# Patient Record
Sex: MALE | Race: WHITE
[De-identification: names, ages, dates, MRNs, and addresses within clinical notes are randomized per-mention and may not be internally consistent; named-entity substitution may affect disease eponyms.]

---

## 2021-03-14 ENCOUNTER — HOSPITAL ENCOUNTER (EMERGENCY)
Dept: HOSPITAL 50 - VM.ED | Age: 81
Discharge: HOME | End: 2021-03-14
Payer: MEDICARE
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
## 2021-03-14 DIAGNOSIS — Z79.899: ICD-10-CM

## 2021-03-14 DIAGNOSIS — F10.220: Primary | ICD-10-CM

## 2021-03-14 LAB
ANION GAP SERPL CALC-SCNC: 14 MMOL/L (ref 5–15)
BARBITURATES UR QL SCN: NEGATIVE
BENZODIAZ UR QL SCN: NEGATIVE
CHLORIDE SERPL-SCNC: 98 MMOL/L (ref 98–107)
EDDP,URINE SCREEN: NEGATIVE
METHADONE UR QL SCN: NEGATIVE
SODIUM SERPL-SCNC: 134 MMOL/L (ref 136–145)
TCA SCREEN,URINE: NEGATIVE
THC UR QL SCN>50 NG/ML: NEGATIVE

## 2021-03-14 NOTE — EDM.PDOC
ED HPI GENERAL MEDICAL PROBLEM





- General


Stated Complaint: WEAKNESS


Time Seen by Provider: 03/14/21 19:05


Source of Information: Reports: Patient, EMS


History Limitations: Reports: Intoxication





- History of Present Illness


INITIAL COMMENTS - FREE TEXT/NARRATIVE: 





Patient comes emergency department today by ambulance from the local [employer] bar 

with complaints of weakness.  According to EMS the patient has been sitting at 

the bar drinking most of the afternoon.  He went to leave and he was complaining

of weakness and had to be held up by 3 people.  He did not fall.  He did not 

strike his head.  He had no complaints.  They were concerned about his weakness 

as this is not typical for him as he is a regular at the bar there.  Upon 

arrival the patient does not want to be here.  He denies drinking any alcohol 

today.  He denies any complaints.  He denies any headache neck or back pain.  No

recent falls trauma or injury.  No visual acuity changes.  No chest pain no 

shortness of breath or difficulty breathing.  No cough or congestion.  He denies

any weakness dizziness lightheadedness.  He relates that he just needed a little

help getting out to his car so that he could drive himself home.  He denies any 

abdominal pain nausea or vomiting.  No paresthesias of his upper or lower 

extremities.  No change in the functionality of his upper or lower extremities. 

NO COVID symptoms no COVID exposure. 





- Related Data


                                    Allergies











Allergy/AdvReac Type Severity Reaction Status Date / Time


 


No Known Allergies Allergy   Verified 03/14/21 20:14











Home Meds: 


                                    Home Meds





Enalapril Maleate 20 mg PO DAILY 03/14/21 [History]


Metoprolol Succinate [Toprol Xl] 100 mg PO DAILY 03/14/21 [History]


amLODIPine [Norvasc] 5 mg PO DAILY 03/14/21 [History]











ED ROS GENERAL





- Review of Systems


Review Of Systems: Comprehensive ROS is negative, except as noted in HPI.





ED EXAM, NEURO





- Physical Exam


Exam: See Below


Text/Narrative:: 





Smells highly of alcoholic beverages and slurring his words. Sclera is mildly 

injected without exudate. 


Exam Limited By: Intoxication


General Appearance: Alert, WD/WN, Obese


Eye Exam: Bilateral Eye: EOMI, Proptosis


Ears: Normal External Exam, Hearing Grossly Normal, Normal TMs.  No: Normal 

Canal (Left canal unremarkable. RIght impacted with large amount of wax that was

removed with a curette a large amount of thick hard wax. )


Nose: Normal Inspection, Normal Mucosa, No Blood


Throat/Mouth: Normal Inspection, Normal Lips, Normal Teeth, Normal Gums, Normal 

Oropharynx, Normal Voice, No Airway Compromise


Head Exam: Atraumatic, Normocephalic


Neck: Normal Inspection, Supple, Non-Tender, Full Range of Motion


Respiratory/Chest: No Respiratory Distress, Lungs Clear, Normal Breath Sounds, 

No Accessory Muscle Use, Chest Non-Tender


Cardiovascular: Normal Peripheral Pulses, Regular Rate, Rhythm


GI/Abdominal: Normal Bowel Sounds, Soft, Non-Tender


 (Male) Exam: Deferred


Rectal (Males) Exam: Deferred


Neurological: Alert, Normal Mood/Affect, CN II-XII Intact, Normal Reflexes, No 

Motor/Sensory Deficits (other than obvious intoxication. )


Back Exam: Normal Inspection, Full Range of Motion


Extremities: Normal Inspection, Normal Range of Motion, Normal Capillary Refill


Psychiatric: Anxious


Skin Exam: Warm, Dry, Intact, Normal Color, No Rash


  ** #1 Interpretation


EKG Date: 03/14/21


Time: 19:34


Rate (Beats/Min): 76


Axis: Normal


P-Wave: Present


QRS: LBBB


ST-T: Normal


QT: Normal


Comparison: NA - No Prior EKG





Course





- Vital Signs


Last Recorded V/S: 


                                Last Vital Signs











Temp  97.8 F   03/14/21 18:55


 


Pulse  74   03/14/21 20:31


 


Resp  16   03/14/21 20:31


 


BP  178/93 H  03/14/21 20:31


 


Pulse Ox  91 L  03/14/21 20:31














- Orders/Labs/Meds


Orders: 


                               Active Orders 24 hr











 Category Date Time Status


 


 EKG Documentation Completion [RC] STAT Care  03/14/21 19:04 Active


 


 CULTURE URINE [RM] Stat Lab  03/14/21 20:00 Received











Labs: 


                                Laboratory Tests











  03/14/21 03/14/21 03/14/21 Range/Units





  19:22 19:22 19:22 


 


WBC  9.5    (4.0-10.0)  x10^3/uL


 


RBC  4.74    (4.5-6.0)  x10^6/uL


 


Hgb  15.2    (14.0-18.0)  g/dL


 


Hct  45.3    (40.0-52.0)  %


 


MCV  95.6 H    (78.0-93.0)  fL


 


MCH  32.1 H    (26.0-32.0)  pg


 


MCHC  33.6    (32.0-36.0)  g/dL


 


RDW Coeff of Anoop  12.8    (10.0-15.0)  %


 


Plt Count  231    (130-400)  x10^3/uL


 


Neut % (Auto)  56.4    (50.0-80.0)  %


 


Lymph % (Auto)  25.4    (25.0-50.0)  %


 


Mono % (Auto)  12.4 H    (2.0-11.0)  %


 


Eos % (Auto)  5.3 H    (0.0-4.0)  %


 


Baso % (Auto)  0.5    (0.2-1.2)  %


 


Sodium   134 L   (136-145)  mmol/L


 


Potassium   4.0   (3.5-5.1)  mmol/L


 


Chloride   98   ()  mmol/L


 


Carbon Dioxide   26   (21-32)  mmol/L


 


Anion Gap   14.0   (5-15)  mmol/L


 


BUN   12   (7-18)  mg/dL


 


Creatinine   1.3   (0.70-1.30)  mg/dL


 


Est Cr Clr Drug Dosing   45.32   mL/min


 


Estimated GFR (MDRD)   53   


 


Glucose   127 H   ()  mg/dL


 


Lactic Acid    3.7 H*  (0.4-2.0)  mmol/L


 


Calcium   8.7   (8.5-10.1)  mg/dL


 


Corrected Calcium   9.18   (8.5-10.1)  mg/dL


 


Magnesium     (1.8-2.4)  mg/dL


 


Total Bilirubin   0.3   (0.2-1.0)  mg/dL


 


AST   38 H   (15-37)  U/L


 


ALT   44   (16-63)  U/L


 


Alkaline Phosphatase   66   ()  U/L


 


Troponin I High Sens   8   (<=76)  ng/L


 


Total Protein   7.3   (6.4-8.2)  g/dL


 


Albumin   3.4   (3.4-5.0)  g/dL


 


Globulin   3.9   


 


Albumin/Globulin Ratio   0.87   


 


Lipase   263   ()  U/L


 


Urine Color     (YELLOW)  


 


Urine Appearance     (CLEAR)  


 


Urine pH     (5.0-8.0)  


 


Ur Specific Gravity     


 


Urine Protein     (NEGATIVE)  mg/dL


 


Urine Glucose (UA)     (NEGATIVE)  mg/dL


 


Urine Ketones     (NEGATIVE)  mg/dL


 


Urine Occult Blood     (NEGATIVE)  


 


Urine Nitrite     (NEGATIVE)  


 


Urine Bilirubin     (NEGATIVE)  


 


Urine Urobilinogen     (0.2)  EU/dL


 


Ur Leukocyte Esterase     (NEGATIVE)  


 


U Hyaline Cast (Auto)     


 


Urine RBC     (NOT SEEN)  /HPF


 


Urine WBC     (NOT SEEN)  /HPF


 


Ur Squamous Epith Cells     (NOT SEEN)  /HPF


 


Amorphous Sediment     


 


Urine Bacteria     (NOT SEEN)  /HPF


 


Urine Mucus     (NOT SEEN)  /LPF


 


Urine Opiates Screen     (NEAGTIVE)  


 


Ur Buprenorphine Scrn     (NEGATIVE)  


 


Ur Oxycodone Screen     (NEGATIVE)  


 


Ur EDDP (Meth Metab)     (NEGATIVE)  


 


Urine Methadone Screen     (NEGATIVE)  


 


Ur Barbiturates Screen     (NEGATIVE)  


 


Ur Tricyclics Screen     (NEGATIVE)  


 


Ur Phencyclidine Scrn     (NEGATIVE)  


 


Ur Amphetamine Screen     (NEGATIVE)  


 


U Methamphetamines Scrn     (NEGATIVE)  


 


Urine MDMA Screen     (NEGATIVE)  


 


U Benzodiazepines Scrn     (NEGATIVE)  


 


U Cocaine Metab Screen     (NEGATIVE)  


 


U Marijuana (THC) Screen     (NEGATIVE)  


 


Ethyl Alcohol   252 H   (0-3)  mg/dL














  03/14/21 03/14/21 03/14/21 Range/Units





  19:22 19:55 20:00 


 


WBC     (4.0-10.0)  x10^3/uL


 


RBC     (4.5-6.0)  x10^6/uL


 


Hgb     (14.0-18.0)  g/dL


 


Hct     (40.0-52.0)  %


 


MCV     (78.0-93.0)  fL


 


MCH     (26.0-32.0)  pg


 


MCHC     (32.0-36.0)  g/dL


 


RDW Coeff of Anoop     (10.0-15.0)  %


 


Plt Count     (130-400)  x10^3/uL


 


Neut % (Auto)     (50.0-80.0)  %


 


Lymph % (Auto)     (25.0-50.0)  %


 


Mono % (Auto)     (2.0-11.0)  %


 


Eos % (Auto)     (0.0-4.0)  %


 


Baso % (Auto)     (0.2-1.2)  %


 


Sodium     (136-145)  mmol/L


 


Potassium     (3.5-5.1)  mmol/L


 


Chloride     ()  mmol/L


 


Carbon Dioxide     (21-32)  mmol/L


 


Anion Gap     (5-15)  mmol/L


 


BUN     (7-18)  mg/dL


 


Creatinine     (0.70-1.30)  mg/dL


 


Est Cr Clr Drug Dosing     mL/min


 


Estimated GFR (MDRD)     


 


Glucose     ()  mg/dL


 


Lactic Acid     (0.4-2.0)  mmol/L


 


Calcium     (8.5-10.1)  mg/dL


 


Corrected Calcium     (8.5-10.1)  mg/dL


 


Magnesium  2.0    (1.8-2.4)  mg/dL


 


Total Bilirubin     (0.2-1.0)  mg/dL


 


AST     (15-37)  U/L


 


ALT     (16-63)  U/L


 


Alkaline Phosphatase     ()  U/L


 


Troponin I High Sens     (<=76)  ng/L


 


Total Protein     (6.4-8.2)  g/dL


 


Albumin     (3.4-5.0)  g/dL


 


Globulin     


 


Albumin/Globulin Ratio     


 


Lipase     ()  U/L


 


Urine Color    Yellow  (YELLOW)  


 


Urine Appearance    Slightly cloudy H  (CLEAR)  


 


Urine pH    5.5  (5.0-8.0)  


 


Ur Specific Gravity    1.015  


 


Urine Protein    30 H  (NEGATIVE)  mg/dL


 


Urine Glucose (UA)    Negative  (NEGATIVE)  mg/dL


 


Urine Ketones    Negative  (NEGATIVE)  mg/dL


 


Urine Occult Blood    Trace-intact H  (NEGATIVE)  


 


Urine Nitrite    Negative  (NEGATIVE)  


 


Urine Bilirubin    Negative  (NEGATIVE)  


 


Urine Urobilinogen    0.2  (0.2)  EU/dL


 


Ur Leukocyte Esterase    Trace H  (NEGATIVE)  


 


U Hyaline Cast (Auto)    Few  


 


Urine RBC    0-5  (NOT SEEN)  /HPF


 


Urine WBC    5-10 H  (NOT SEEN)  /HPF


 


Ur Squamous Epith Cells    Occasional H  (NOT SEEN)  /HPF


 


Amorphous Sediment    Few  


 


Urine Bacteria    Few H  (NOT SEEN)  /HPF


 


Urine Mucus    Few H  (NOT SEEN)  /LPF


 


Urine Opiates Screen   Negative   (NEAGTIVE)  


 


Ur Buprenorphine Scrn   Negative   (NEGATIVE)  


 


Ur Oxycodone Screen   Negative   (NEGATIVE)  


 


Ur EDDP (Meth Metab)   Negative   (NEGATIVE)  


 


Urine Methadone Screen   Negative   (NEGATIVE)  


 


Ur Barbiturates Screen   Negative   (NEGATIVE)  


 


Ur Tricyclics Screen   Negative   (NEGATIVE)  


 


Ur Phencyclidine Scrn   Negative   (NEGATIVE)  


 


Ur Amphetamine Screen   Negative   (NEGATIVE)  


 


U Methamphetamines Scrn   Negative   (NEGATIVE)  


 


Urine MDMA Screen   Negative   (NEGATIVE)  


 


U Benzodiazepines Scrn   Negative   (NEGATIVE)  


 


U Cocaine Metab Screen   Negative   (NEGATIVE)  


 


U Marijuana (THC) Screen   Negative   (NEGATIVE)  


 


Ethyl Alcohol     (0-3)  mg/dL














  03/14/21 03/14/21 Range/Units





  21:46 21:46 


 


WBC    (4.0-10.0)  x10^3/uL


 


RBC    (4.5-6.0)  x10^6/uL


 


Hgb    (14.0-18.0)  g/dL


 


Hct    (40.0-52.0)  %


 


MCV    (78.0-93.0)  fL


 


MCH    (26.0-32.0)  pg


 


MCHC    (32.0-36.0)  g/dL


 


RDW Coeff of Anoop    (10.0-15.0)  %


 


Plt Count    (130-400)  x10^3/uL


 


Neut % (Auto)    (50.0-80.0)  %


 


Lymph % (Auto)    (25.0-50.0)  %


 


Mono % (Auto)    (2.0-11.0)  %


 


Eos % (Auto)    (0.0-4.0)  %


 


Baso % (Auto)    (0.2-1.2)  %


 


Sodium    (136-145)  mmol/L


 


Potassium    (3.5-5.1)  mmol/L


 


Chloride    ()  mmol/L


 


Carbon Dioxide    (21-32)  mmol/L


 


Anion Gap    (5-15)  mmol/L


 


BUN    (7-18)  mg/dL


 


Creatinine    (0.70-1.30)  mg/dL


 


Est Cr Clr Drug Dosing    mL/min


 


Estimated GFR (MDRD)    


 


Glucose    ()  mg/dL


 


Lactic Acid  2.7 H*   (0.4-2.0)  mmol/L


 


Calcium    (8.5-10.1)  mg/dL


 


Corrected Calcium    (8.5-10.1)  mg/dL


 


Magnesium    (1.8-2.4)  mg/dL


 


Total Bilirubin    (0.2-1.0)  mg/dL


 


AST    (15-37)  U/L


 


ALT    (16-63)  U/L


 


Alkaline Phosphatase    ()  U/L


 


Troponin I High Sens    (<=76)  ng/L


 


Total Protein    (6.4-8.2)  g/dL


 


Albumin    (3.4-5.0)  g/dL


 


Globulin    


 


Albumin/Globulin Ratio    


 


Lipase    ()  U/L


 


Urine Color    (YELLOW)  


 


Urine Appearance    (CLEAR)  


 


Urine pH    (5.0-8.0)  


 


Ur Specific Gravity    


 


Urine Protein    (NEGATIVE)  mg/dL


 


Urine Glucose (UA)    (NEGATIVE)  mg/dL


 


Urine Ketones    (NEGATIVE)  mg/dL


 


Urine Occult Blood    (NEGATIVE)  


 


Urine Nitrite    (NEGATIVE)  


 


Urine Bilirubin    (NEGATIVE)  


 


Urine Urobilinogen    (0.2)  EU/dL


 


Ur Leukocyte Esterase    (NEGATIVE)  


 


U Hyaline Cast (Auto)    


 


Urine RBC    (NOT SEEN)  /HPF


 


Urine WBC    (NOT SEEN)  /HPF


 


Ur Squamous Epith Cells    (NOT SEEN)  /HPF


 


Amorphous Sediment    


 


Urine Bacteria    (NOT SEEN)  /HPF


 


Urine Mucus    (NOT SEEN)  /LPF


 


Urine Opiates Screen    (NEAGTIVE)  


 


Ur Buprenorphine Scrn    (NEGATIVE)  


 


Ur Oxycodone Screen    (NEGATIVE)  


 


Ur EDDP (Meth Metab)    (NEGATIVE)  


 


Urine Methadone Screen    (NEGATIVE)  


 


Ur Barbiturates Screen    (NEGATIVE)  


 


Ur Tricyclics Screen    (NEGATIVE)  


 


Ur Phencyclidine Scrn    (NEGATIVE)  


 


Ur Amphetamine Screen    (NEGATIVE)  


 


U Methamphetamines Scrn    (NEGATIVE)  


 


Urine MDMA Screen    (NEGATIVE)  


 


U Benzodiazepines Scrn    (NEGATIVE)  


 


U Cocaine Metab Screen    (NEGATIVE)  


 


U Marijuana (THC) Screen    (NEGATIVE)  


 


Ethyl Alcohol   188 H  (0-3)  mg/dL











Meds: 


Medications














Discontinued Medications














Generic Name Dose Route Start Last Admin





  Trade Name Freq  PRN Reason Stop Dose Admin


 


Multivitamins/Minerals 1 tab/  0 tab  03/14/21 20:13  03/14/21 20:21





Thiamine HCl 100 mg/ Folic  PO  03/14/21 20:14  1 each





Acid 1 mg/ Magnesium Oxide 400  ONETIME ONE   Administration





mg   














- Re-Assessments/Exams


Free Text/Narrative Re-Assessment/Exam: 





03/14/21 





EKG completed without any ST elevation or depression when reviewed 

extemporaneously by myself.





Laboratory evaluation shows a WBC of 9.5 a hemoglobin of 15.5 and a platelet of 

231.





CMP with a sodium of 134 potassium of 4.0 creatinine 1.3 and BUN of 12.





T bili normal AST mildly elevated at 38 and ALT of 44.





Troponin high-sensitivity normal at 8.





Magnesium 2.0.





Glucose 127.





Lactic acid elevated at 3.7.  I think that this is most likely due to 

dehydration as he shows no signs of infection or he is not on any medications 

that would cause lactic acidosis such as Metformin or albuterol.





Despite the patient stating that he has not been drinking today his alcohol is 

252.  This could be because of his lactic acidosis due to dehydration or other 

physiology.  He is rather unsteady on his feet and is needing assistance.  His 

niece and other family members are here with him.





He was given something to eat as well as multiple glasses of water.  As this 

gentleman is quite elderly and unstable on his feet and his alcohol is quite 

high I think it is best that I observe him here in the emergency department 

repeat his lactic acid as well as his alcohol to see if it is going up or if it 

is going down.  He and his family are agreeable to this and their questions are 

answered.








03/14/21 23:08


The patient level of intoxication is improved by clinical exam.  He is alert 

appropriate.  Without any distress.  He has drank multiple glasses of water and 

he has had some food to eat.  He is cooperative.  His alcohol is improved down 

to 188 as well as his lactic acid down to 2.7.  I think that his lactic carlos

vation is most likely due to dehydration he shows no signs of infection or other

 concerns at this time.  We will discharge him home with his niece who will stay

 with him tonight as he is intoxicated and unsteady on his feet and we want to 

prevent any further injury or harm.  The niece takes responsibility of the 

discharging this elderly intoxicated male.  I discussed with him the legal 

aspect of him attempting to drive home with a blood alcohol of greater than 

0.25.  Uninterested in assistance with his alcoholism that he has been drinking 

with for the last 45 years.  Shows no signs of withdrawal.  We will discharge 

him home with his family.  Discharge directions as below are explained to the 

patient and his family they are comfortable with this plan and his questions 

were answered.





Departure





- Departure


Time of Disposition: 20:12


Disposition: Home, Self-Care 01


Clinical Impression: 


Acute alcoholic intoxication in alcoholism (blood level 0.08-0.29)


Qualifiers:


 Complication of substance-induced condition: uncomplicated Qualified Code(s): 

F10.220 - Alcohol dependence with intoxication, uncomplicated








- Discharge Information


Instructions:  Alcohol Use Disorder, Alcohol Intoxication, Easy-to-Read


Referrals: 


Nima Hutchins MD [Primary Care Provider] - 


Additional Instructions: 


Home tonight with family


Rest tonight. 


Tomorrow. Make sure and drink plenty of fluids including water and electrolyte 

containing materials such as gatorade and or powerade. 


Your alcohol level tonight was 0.252 and you were planning on driving home. This

is about 3 times the legal limit to operate a vehicle. 


If you are interested in assistance with your alcohol use disorder, discuss with

your PCP or seek assistance at the [facility] 

[phone].


Return to the ED if new or worsening symptoms. 


Follow up if any concerns. 





Sepsis Event Note (ED)





- Focused Exam


Vital Signs: 


                                   Vital Signs











  Temp Pulse Resp BP Pulse Ox


 


 03/14/21 20:31   74  16  178/93 H  91 L


 


 03/14/21 18:55  97.8 F  96  16  138/66  96














- My Orders


Last 24 Hours: 


My Active Orders





03/14/21 19:04


EKG Documentation Completion [RC] STAT 





03/14/21 20:00


CULTURE URINE [RM] Stat 














- Assessment/Plan


Last 24 Hours: 


My Active Orders





03/14/21 19:04


EKG Documentation Completion [RC] STAT 





03/14/21 20:00


CULTURE URINE [RM] Stat

## 2023-08-31 ENCOUNTER — HOSPITAL ENCOUNTER (EMERGENCY)
Dept: HOSPITAL 50 - VM.ED | Age: 83
Discharge: HOME | End: 2023-08-31
Payer: MEDICARE

## 2023-08-31 DIAGNOSIS — I10: ICD-10-CM

## 2023-08-31 DIAGNOSIS — Z86.73: ICD-10-CM
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
## 2023-08-31 DIAGNOSIS — Z79.899: ICD-10-CM

## 2023-08-31 DIAGNOSIS — S70.01XA: Primary | ICD-10-CM

## 2023-08-31 PROCEDURE — 99283 EMERGENCY DEPT VISIT LOW MDM: CPT

## 2023-08-31 PROCEDURE — 73502 X-RAY EXAM HIP UNI 2-3 VIEWS: CPT

## 2023-08-31 RX ADMIN — HYDROCODONE BITARTRATE AND ACETAMINOPHEN ONE TAB: 5; 325 TABLET ORAL at 14:51
